# Patient Record
Sex: MALE | URBAN - METROPOLITAN AREA
[De-identification: names, ages, dates, MRNs, and addresses within clinical notes are randomized per-mention and may not be internally consistent; named-entity substitution may affect disease eponyms.]

---

## 2023-12-19 ENCOUNTER — TELEPHONE ENCOUNTER (OUTPATIENT)
Dept: URBAN - METROPOLITAN AREA CLINIC 23 | Facility: CLINIC | Age: 68
End: 2023-12-19

## 2025-06-24 ENCOUNTER — OFFICE VISIT (OUTPATIENT)
Dept: URBAN - METROPOLITAN AREA CLINIC 88 | Facility: CLINIC | Age: 70
End: 2025-06-24

## 2025-06-24 ENCOUNTER — DASHBOARD ENCOUNTERS (OUTPATIENT)
Age: 70
End: 2025-06-24

## 2025-06-24 ENCOUNTER — LAB OUTSIDE AN ENCOUNTER (OUTPATIENT)
Dept: URBAN - METROPOLITAN AREA CLINIC 88 | Facility: CLINIC | Age: 70
End: 2025-06-24

## 2025-06-24 PROBLEM — 235595009: Status: ACTIVE | Noted: 2025-06-24

## 2025-06-24 RX ORDER — OMEPRAZOLE 20 MG/1
CAPSULE, DELAYED RELEASE ORAL
OUTPATIENT

## 2025-06-24 RX ORDER — GABAPENTIN 600 MG/1
TAKE ONE TABLET BY MOUTH THREE TIMES A DAY TABLET, COATED ORAL
Qty: 270 UNSPECIFIED | Refills: 0 | Status: ACTIVE | COMMUNITY

## 2025-06-24 RX ORDER — SIMVASTATIN 20 MG/1
TABLET, FILM COATED ORAL
Qty: 90 TABLET | Status: ACTIVE | COMMUNITY

## 2025-06-24 RX ORDER — MELOXICAM 15 MG/1
TAKE ONE TABLET BY MOUTH ONE TIME DAILY WITH A MEAL TABLET ORAL
Qty: 30 UNSPECIFIED | Refills: 1 | Status: ACTIVE | COMMUNITY

## 2025-06-24 RX ORDER — LISINOPRIL 20 MG/1
TAKE ONE TABLET BY MOUTH ONE TIME DAILY TABLET ORAL
Qty: 90 UNSPECIFIED | Refills: 1 | Status: ACTIVE | COMMUNITY

## 2025-06-24 RX ORDER — OMEPRAZOLE 20 MG/1
CAPSULE, DELAYED RELEASE ORAL
Qty: 180 CAPSULE | Status: ACTIVE | COMMUNITY

## 2025-06-24 RX ORDER — TAMSULOSIN HYDROCHLORIDE 0.4 MG/1
CAPSULE ORAL
Qty: 90 CAPSULE | Status: ACTIVE | COMMUNITY

## 2025-06-24 NOTE — HPI-TODAY'S VISIT:
Patient presents today to schedule surveillance colonoscopy and repeat EGD procedure.  Per our records, his last colonoscopy was in May 2011 with one tubular adenoma polyps removed and left sided diverticulosis.  Repeat colonoscopy was recommenmded in 5 years.  Voices alternating bowel habits.  Defecation occurs multiple times a day vs skipping 2 days of defecation.  Stool consistency varies from formed to loose.  Loose stools increased after gallbladder surgery.  States complete evacuation of stool is expeirenced 50% of the time.  He reports increase gas and bloating that improves with defecation.    He has a hx of GERD with fundoplication surgery.  States GERD is controlled with omeprazole 20 mg once a day.  Denies breakthrough symptoms or the need for additional medications to treat GERD.  Her last EGD was by [facility] in December 2023 with "solid food noted in the distal esophagus".  Patient states he was informed of "something being in my esophagus" and that a repeat procedure was recommended.  He denies having a repeat EGD to assess symptoms further.

## 2025-07-07 ENCOUNTER — CLAIMS CREATED FROM THE CLAIM WINDOW (OUTPATIENT)
Dept: URBAN - METROPOLITAN AREA SURGERY CENTER 24 | Facility: SURGERY CENTER | Age: 70
End: 2025-07-07
Payer: COMMERCIAL

## 2025-07-07 ENCOUNTER — CLAIMS CREATED FROM THE CLAIM WINDOW (OUTPATIENT)
Dept: URBAN - METROPOLITAN AREA CLINIC 4 | Facility: CLINIC | Age: 70
End: 2025-07-07
Payer: COMMERCIAL

## 2025-07-07 DIAGNOSIS — K62.1 ANAL AND RECTAL POLYP: ICD-10-CM

## 2025-07-07 DIAGNOSIS — D12.2 ADENOMA OF ASCENDING COLON: ICD-10-CM

## 2025-07-07 DIAGNOSIS — K21.9 GASTRO-ESOPHAGEAL REFLUX DISEASE WITHOUT ESOPHAGITIS: ICD-10-CM

## 2025-07-07 DIAGNOSIS — Z86.0100 HISTORY OF COLON POLYPS: ICD-10-CM

## 2025-07-07 DIAGNOSIS — Z09 ENCNTR FOR F/U EXAM AFT TRTMT FOR COND OTH THAN MALIG NEOPLM: ICD-10-CM

## 2025-07-07 DIAGNOSIS — K31.89 OTHER DISEASES OF STOMACH AND DUODENUM: ICD-10-CM

## 2025-07-07 DIAGNOSIS — K63.5 COLONIC POLYPS: ICD-10-CM

## 2025-07-07 DIAGNOSIS — K62.1 RECTAL POLYP: ICD-10-CM

## 2025-07-07 DIAGNOSIS — K29.80 DUODENITIS WITHOUT BLEEDING: ICD-10-CM

## 2025-07-07 DIAGNOSIS — D12.2 BENIGN NEOPLASM OF ASCENDING COLON: ICD-10-CM

## 2025-07-07 DIAGNOSIS — Z86.0100 PERSONAL HISTORY OF COLON POLYPS, UNSPECIFIED: ICD-10-CM

## 2025-07-07 DIAGNOSIS — D12.3 ADENOMA OF TRANSVERSE COLON: ICD-10-CM

## 2025-07-07 DIAGNOSIS — D12.3 BENIGN NEOPLASM OF TRANSVERSE COLON: ICD-10-CM

## 2025-07-07 PROCEDURE — 88312 SPECIAL STAINS GROUP 1: CPT | Performed by: PATHOLOGY

## 2025-07-07 PROCEDURE — 43239 EGD BIOPSY SINGLE/MULTIPLE: CPT | Performed by: INTERNAL MEDICINE

## 2025-07-07 PROCEDURE — 00813 ANES UPR LWR GI NDSC PX: CPT | Performed by: NURSE ANESTHETIST, CERTIFIED REGISTERED

## 2025-07-07 PROCEDURE — 45385 COLONOSCOPY W/LESION REMOVAL: CPT | Performed by: INTERNAL MEDICINE

## 2025-07-07 PROCEDURE — 0529F INTRVL 3/>YR PTS CLNSCP DOCD: CPT | Performed by: INTERNAL MEDICINE

## 2025-07-07 PROCEDURE — 88305 TISSUE EXAM BY PATHOLOGIST: CPT | Performed by: PATHOLOGY

## 2025-07-07 RX ORDER — GABAPENTIN 600 MG/1
TAKE ONE TABLET BY MOUTH THREE TIMES A DAY TABLET, COATED ORAL
Qty: 270 UNSPECIFIED | Refills: 0 | Status: ACTIVE | COMMUNITY

## 2025-07-07 RX ORDER — OMEPRAZOLE 20 MG/1
CAPSULE, DELAYED RELEASE ORAL
Status: ACTIVE | COMMUNITY

## 2025-07-07 RX ORDER — SIMVASTATIN 20 MG/1
TABLET, FILM COATED ORAL
Qty: 90 TABLET | Status: ACTIVE | COMMUNITY

## 2025-07-07 RX ORDER — LISINOPRIL 20 MG/1
TAKE ONE TABLET BY MOUTH ONE TIME DAILY TABLET ORAL
Qty: 90 UNSPECIFIED | Refills: 1 | Status: ACTIVE | COMMUNITY

## 2025-07-07 RX ORDER — TAMSULOSIN HYDROCHLORIDE 0.4 MG/1
CAPSULE ORAL
Qty: 90 CAPSULE | Status: ACTIVE | COMMUNITY

## 2025-07-07 RX ORDER — MELOXICAM 15 MG/1
TAKE ONE TABLET BY MOUTH ONE TIME DAILY WITH A MEAL TABLET ORAL
Qty: 30 UNSPECIFIED | Refills: 1 | Status: ACTIVE | COMMUNITY